# Patient Record
Sex: FEMALE | NOT HISPANIC OR LATINO | URBAN - METROPOLITAN AREA
[De-identification: names, ages, dates, MRNs, and addresses within clinical notes are randomized per-mention and may not be internally consistent; named-entity substitution may affect disease eponyms.]

---

## 2018-09-20 NOTE — PATIENT DISCUSSION
CRYSTAL OU:  PRESCRIBED UV PROTECTION TO SLOW GROWTH. PRESCRIBE ARTIFICAL TEARS TO INCREASE COMFORT.

## 2018-09-20 NOTE — PATIENT DISCUSSION
EYELID PTOSIS, OU: NO DIPLOPIA, ANISOCORIA OR FATIGUING SYMPTOMS. NOT BOTHERSOME TO PATIENT, RECOMMEND CONSULT WITH SPECIALIST WHEN BECOMES BOTHERSOME.

## 2018-09-20 NOTE — PATIENT DISCUSSION
AMD (DRY), OS: PRESCRIBE AREDS 2 VITAMINS AND UV PROTECTION TO SLOW PROGRESSION. SMOKING CESSATION EMPHASIZED. PRESCRIBE REGULAR AMSLER GRID CHECKS TO SELF MONITOR. PATIENT TO CALL WITH CHANGES FOR RETINA CONSULT. RETURN FOR FOLLOW-UP AS SCHEDULED.

## 2019-03-26 ENCOUNTER — IMPORTED ENCOUNTER (OUTPATIENT)
Dept: URBAN - METROPOLITAN AREA CLINIC 1 | Facility: CLINIC | Age: 54
End: 2019-03-26

## 2019-03-26 PROBLEM — H52.13: Noted: 2019-03-26

## 2019-03-26 PROCEDURE — S0621 ROUTINE OPHTHALMOLOGICAL EXA: HCPCS

## 2019-03-26 NOTE — PATIENT DISCUSSION
1. Myopia OU- Rx for glasses given 2. Cataracts OU- observe. Return for an appointment in 1 yr 27 with Dr. Almaz Montes De Oca.

## 2020-09-16 NOTE — PATIENT DISCUSSION
POSTERIOR CAPSULAR FIBROSIS, O:  VISUALLY SIGNIFICANT. OPTION OF YAG LASER VERSUS UPDATING GLASSES VERSUS FOLLOWING DISCUSSED. RBA'S DISCUSSED, PATIENT UNDERSTANDS AND DESIRES YAG LASER TO INCREASE VISION FOR READING FINE PRINT AND TO REDUCE GLARE IN ORDER TO DRIVE SAFELY.   SCHEDULE YAG LASER OS THEN OD.

## 2021-07-13 ENCOUNTER — IMPORTED ENCOUNTER (OUTPATIENT)
Dept: URBAN - METROPOLITAN AREA CLINIC 1 | Facility: CLINIC | Age: 56
End: 2021-07-13

## 2021-07-13 PROBLEM — H52.4: Noted: 2021-07-13

## 2021-07-13 PROCEDURE — S0621 ROUTINE OPHTHALMOLOGICAL EXA: HCPCS

## 2021-07-13 NOTE — PATIENT DISCUSSION
1. Myopia/Presbyopia: Rx was given for corrective spectacles. 2. Return for an appointment for 1 year for a 36 with Dr. Jerome Sauceda.

## 2021-08-04 ENCOUNTER — IMPORTED ENCOUNTER (OUTPATIENT)
Dept: URBAN - METROPOLITAN AREA CLINIC 1 | Facility: CLINIC | Age: 56
End: 2021-08-04

## 2021-08-04 NOTE — PATIENT DISCUSSION
1.  Glasses Check -- RBF re-refracted and MRx finalized and given to patient. Return for an appointment as scheduled ([phone]) with Dr. Yenifer Huang.

## 2022-04-08 ASSESSMENT — KERATOMETRY
OS_K1POWER_DIOPTERS: 45.75
OS_AXISANGLE2_DEGREES: 116
OD_K1POWER_DIOPTERS: 45.25
OS_K2POWER_DIOPTERS: 46.00
OD_AXISANGLE_DEGREES: 093
OS_AXISANGLE_DEGREES: 026
OD_AXISANGLE2_DEGREES: 003
OD_K2POWER_DIOPTERS: 46.25

## 2022-04-08 ASSESSMENT — VISUAL ACUITY
OD_SC: 20/20
OS_CC: 20/30
OS_SC: 20/20
OD_CC: 20/25-1
OS_SC: 20/20-2
OD_SC: 20/20-2
OD_CC: 20/25
OD_SC: J2
OS_CC: 20/25-2
OS_SC: J2

## 2022-04-08 ASSESSMENT — TONOMETRY
OS_IOP_MMHG: 13
OD_IOP_MMHG: 12
OD_IOP_MMHG: 13
OS_IOP_MMHG: 12

## 2022-06-20 NOTE — PATIENT DISCUSSION
Minimal change to PCO, discussed the risks/benefits of laser capsulotomy. Pt would like to observe for now.

## 2022-07-14 ENCOUNTER — COMPREHENSIVE EXAM (OUTPATIENT)
Dept: URBAN - METROPOLITAN AREA CLINIC 1 | Facility: CLINIC | Age: 57
End: 2022-07-14

## 2022-07-14 DIAGNOSIS — H52.4: ICD-10-CM

## 2022-07-14 DIAGNOSIS — H52.223: ICD-10-CM

## 2022-07-14 DIAGNOSIS — H52.13: ICD-10-CM

## 2022-07-14 PROCEDURE — 92015 DETERMINE REFRACTIVE STATE: CPT

## 2022-07-14 PROCEDURE — 92014 COMPRE OPH EXAM EST PT 1/>: CPT

## 2022-07-14 ASSESSMENT — TONOMETRY
OD_IOP_MMHG: 14
OS_IOP_MMHG: 14

## 2022-07-14 ASSESSMENT — VISUAL ACUITY
OS_SC: 20/20 -2
OD_SC: 20/25 -1

## 2023-07-19 ENCOUNTER — COMPREHENSIVE EXAM (OUTPATIENT)
Dept: URBAN - METROPOLITAN AREA CLINIC 1 | Facility: CLINIC | Age: 58
End: 2023-07-19

## 2023-07-19 DIAGNOSIS — H52.223: ICD-10-CM

## 2023-07-19 DIAGNOSIS — H52.4: ICD-10-CM

## 2023-07-19 DIAGNOSIS — H52.13: ICD-10-CM

## 2023-07-19 DIAGNOSIS — Z01.00: ICD-10-CM

## 2023-07-19 PROCEDURE — 92014 COMPRE OPH EXAM EST PT 1/>: CPT

## 2023-07-19 PROCEDURE — 92015 DETERMINE REFRACTIVE STATE: CPT

## 2023-07-19 ASSESSMENT — TONOMETRY
OD_IOP_MMHG: 15
OS_IOP_MMHG: 16

## 2023-07-19 ASSESSMENT — VISUAL ACUITY
OD_CC: 20/20-2
OS_CC: 20/20

## 2024-09-17 ENCOUNTER — COMPREHENSIVE EXAM (OUTPATIENT)
Dept: URBAN - METROPOLITAN AREA CLINIC 1 | Facility: CLINIC | Age: 59
End: 2024-09-17

## 2024-09-17 DIAGNOSIS — H52.223: ICD-10-CM

## 2024-09-17 DIAGNOSIS — H52.4: ICD-10-CM

## 2024-09-17 DIAGNOSIS — Z01.00: ICD-10-CM

## 2024-09-17 DIAGNOSIS — H52.13: ICD-10-CM

## 2024-09-17 PROCEDURE — 92014 COMPRE OPH EXAM EST PT 1/>: CPT

## 2024-09-17 PROCEDURE — 92015 DETERMINE REFRACTIVE STATE: CPT

## 2024-09-24 ENCOUNTER — DIAGNOSTICS ONLY (OUTPATIENT)
Dept: URBAN - METROPOLITAN AREA CLINIC 1 | Facility: CLINIC | Age: 59
End: 2024-09-24

## 2024-09-24 DIAGNOSIS — H52.4: ICD-10-CM

## 2024-09-24 PROCEDURE — 92015 DETERMINE REFRACTIVE STATE: CPT | Mod: NC
